# Patient Record
Sex: MALE | URBAN - METROPOLITAN AREA
[De-identification: names, ages, dates, MRNs, and addresses within clinical notes are randomized per-mention and may not be internally consistent; named-entity substitution may affect disease eponyms.]

---

## 2017-01-10 ENCOUNTER — APPOINTMENT (RX ONLY)
Dept: URBAN - METROPOLITAN AREA CLINIC 71 | Facility: CLINIC | Age: 27
Setting detail: DERMATOLOGY
End: 2017-01-10

## 2017-01-10 VITALS
WEIGHT: 177 LBS | HEIGHT: 71 IN | SYSTOLIC BLOOD PRESSURE: 125 MMHG | HEART RATE: 53 BPM | DIASTOLIC BLOOD PRESSURE: 72 MMHG

## 2017-01-10 DIAGNOSIS — L30.0 NUMMULAR DERMATITIS: ICD-10-CM | Status: INADEQUATELY CONTROLLED

## 2017-01-10 DIAGNOSIS — L90.5 SCAR CONDITIONS AND FIBROSIS OF SKIN: ICD-10-CM

## 2017-01-10 PROCEDURE — 99202 OFFICE O/P NEW SF 15 MIN: CPT

## 2017-01-10 PROCEDURE — ? PRESCRIPTION

## 2017-01-10 PROCEDURE — 87220 TISSUE EXAM FOR FUNGI: CPT

## 2017-01-10 PROCEDURE — ? COUNSELING

## 2017-01-10 PROCEDURE — ? TREATMENT REGIMEN

## 2017-01-10 PROCEDURE — ? KOH PREP

## 2017-01-10 RX ORDER — FLUOCINONIDE 1 MG/G
CREAM TOPICAL
Qty: 1 | Refills: 0 | Status: ERX | COMMUNITY
Start: 2017-01-10

## 2017-01-10 RX ORDER — HYP AC/SOD CHL/SOD SUL/SOD PHO 0.009 %
SPRAY, NON-AEROSOL (ML) TOPICAL
Qty: 1 | Refills: 2 | Status: ERX | COMMUNITY
Start: 2017-01-10

## 2017-01-10 RX ADMIN — Medication: at 00:00

## 2017-01-10 RX ADMIN — FLUOCINONIDE: 1 CREAM TOPICAL at 00:00

## 2017-01-10 ASSESSMENT — LOCATION DETAILED DESCRIPTION DERM: LOCATION DETAILED: LEFT MEDIAL PLANTAR MIDFOOT

## 2017-01-10 ASSESSMENT — LOCATION SIMPLE DESCRIPTION DERM: LOCATION SIMPLE: LEFT PLANTAR SURFACE

## 2017-01-10 ASSESSMENT — LOCATION ZONE DERM: LOCATION ZONE: FEET

## 2017-01-10 ASSESSMENT — SEVERITY ASSESSMENT: SEVERITY: MILD

## 2017-01-10 NOTE — PROCEDURE: KOH PREP
Add  To Bill: No
ia Id #: [id number]
Showing: negative findings within normal realm
Detail Level: Detailed
Koh Intro Text (From The.....): A KOH prep was ordered and evaluated from the

## 2017-01-10 NOTE — PROCEDURE: MIPS QUALITY
Quality 431: Preventive Care And Screening: Unhealthy Alcohol Use - Screening: Patient screened for unhealthy alcohol use using a single question and scores less than 2 times per year
Quality 110: Preventive Care And Screening: Influenza Immunization: Influenza immunization was not ordered or administered, reason not given
Detail Level: Detailed

## 2017-01-10 NOTE — PROCEDURE: TREATMENT REGIMEN
Detail Level: Zone
Plan: Several treatment options were discussed with the pt, including conventional dermabrasion, laser resurfacing, and fillers. Did not recommend any of these. Pt may try Celacyn from sample to see if he obtains any improvement
Samples Given: Celacyn

## 2017-01-10 NOTE — HPI: SCAR (COMPLEX), FACE
How Severe Is The Scar?: mild
What Are Your Concerns? (Check All That Apply): orientation
Is This A New Presentation, Or A Follow-Up?: Facial Scar
Any Other Pertinent Features?: Rough, irregular surface visible on left forehead
Additional History: Pt wants consult on scar softening secondary to shingles 5 years ago. No discomfort reported.  Has vocation facing public and wants improvement.